# Patient Record
Sex: FEMALE | Race: WHITE | NOT HISPANIC OR LATINO
[De-identification: names, ages, dates, MRNs, and addresses within clinical notes are randomized per-mention and may not be internally consistent; named-entity substitution may affect disease eponyms.]

---

## 2020-03-03 ENCOUNTER — RESULT REVIEW (OUTPATIENT)
Age: 70
End: 2020-03-03

## 2021-09-16 PROBLEM — Z00.00 ENCOUNTER FOR PREVENTIVE HEALTH EXAMINATION: Status: ACTIVE | Noted: 2021-09-16

## 2021-10-12 ENCOUNTER — APPOINTMENT (OUTPATIENT)
Dept: PULMONOLOGY | Facility: CLINIC | Age: 71
End: 2021-10-12
Payer: MEDICARE

## 2021-10-12 VITALS — BODY MASS INDEX: 26.81 KG/M2 | HEIGHT: 59 IN | WEIGHT: 133 LBS

## 2021-10-12 DIAGNOSIS — J44.9 CHRONIC OBSTRUCTIVE PULMONARY DISEASE, UNSPECIFIED: ICD-10-CM

## 2021-10-12 DIAGNOSIS — E78.5 HYPERLIPIDEMIA, UNSPECIFIED: ICD-10-CM

## 2021-10-12 DIAGNOSIS — Z78.9 OTHER SPECIFIED HEALTH STATUS: ICD-10-CM

## 2021-10-12 DIAGNOSIS — G50.0 TRIGEMINAL NEURALGIA: ICD-10-CM

## 2021-10-12 PROCEDURE — 99204 OFFICE O/P NEW MOD 45 MIN: CPT

## 2021-10-12 RX ORDER — DULOXETINE HYDROCHLORIDE 30 MG/1
CAPSULE, DELAYED RELEASE ORAL
Refills: 0 | Status: ACTIVE | COMMUNITY

## 2021-10-12 RX ORDER — CARBAMAZEPINE 200 MG/1
TABLET ORAL
Refills: 0 | Status: ACTIVE | COMMUNITY

## 2021-10-12 RX ORDER — ROSUVASTATIN CALCIUM 20 MG/1
20 TABLET, FILM COATED ORAL
Refills: 0 | Status: ACTIVE | COMMUNITY

## 2021-10-12 NOTE — PHYSICAL EXAM
[General Appearance - Well Developed] : well developed [General Appearance - Well Nourished] : well nourished [IV] : IV [Heart Sounds] : normal S1 and S2 [Murmurs] : no murmurs [] : no respiratory distress [Auscultation Breath Sounds / Voice Sounds] : lungs were clear to auscultation bilaterally [No Focal Deficits] : no focal deficits [Oriented To Time, Place, And Person] : oriented to person, place, and time [Memory Recent] : recent memory was not impaired [FreeTextEntry1] : crowded oropharynx

## 2021-10-12 NOTE — ASSESSMENT
[FreeTextEntry1] : 71-year-old woman with history of severe obstructive sleep apnea for failed CPAP therapy.\par \par We will start with oral appliance therapy and see if the sleep apnea improves, will also consider deviated nasal septal surgery and turbinate hypertrophy reduction once the sleep study is done with oral appliance.

## 2021-10-12 NOTE — HISTORY OF PRESENT ILLNESS
[FreeTextEntry1] : Dr. Cho\par 71 year old woman  with history of DANIELE diagnosed around 2018, was given cpap but couldn’t tolerate with different masks.\par  Patient is here in the sleep center to address sleep apnea.  Patient is sleepy with [place] sleepiness score of 12.  Patient has very loud snoring , also has witnessed apneas.  Patient's bedtime is around 10.30 PM wakes up in the morning around 6.30 AM.  She feels tired when she wakes up.  Patient drinks 5 cups of coffee during the daytime. Patient does not have any headaches, she has nocturia is up about 3 times.  She gets sleepy while driving.\par \par Last sleep study showed AHI of 46.  Patient has also seen ENT physician which showed deviated nasal septum and turbinate hypertrophy.\par \par I do not think fixing the deviated septum or turbinates will improve the sleep apnea significantly.  Given the CPAP failure the next best choice would be to get getting an oral appliance therapy and see if that improves patient's apnea.  I understand patient has trigeminal neuralgia with may pose some problems if he gets worse with oral appliance therapy.  We will discuss further about inspire therapy at that time if oral appliance fails.\par

## 2022-07-11 ENCOUNTER — APPOINTMENT (OUTPATIENT)
Dept: PULMONOLOGY | Facility: CLINIC | Age: 72
End: 2022-07-11

## 2022-07-11 VITALS
HEART RATE: 66 BPM | HEIGHT: 59 IN | BODY MASS INDEX: 26.81 KG/M2 | DIASTOLIC BLOOD PRESSURE: 70 MMHG | SYSTOLIC BLOOD PRESSURE: 130 MMHG | WEIGHT: 133 LBS

## 2022-07-11 PROCEDURE — 99214 OFFICE O/P EST MOD 30 MIN: CPT

## 2022-07-11 NOTE — HISTORY OF PRESENT ILLNESS
[FreeTextEntry1] : Dr. Parikh\par 72 year old woman  with history of DANIELE diagnosed around 2018, was given cpap but couldn’t tolerate with different masks.\par  Patient is here in the sleep center to address sleep apnea.  Patient is sleepy with Martin sleepiness score of 12.  Patient has snoring , also has witnessed apneas.  Patient's bedtime is around 11.30 PM wakes up in the morning around 6 AM.  She feels tired when she wakes up.  Patient drinks 3 cups of coffee during the daytime. Patient does not have any headaches, she has nocturia is up about 3 times.  She is not sleepy while driving.\par \par Last sleep study showed AHI of 46.  \par \par She is looking into inspire therapy due to cpap failure.\par

## 2022-07-11 NOTE — ASSESSMENT
[FreeTextEntry1] : 72-year-old woman with history of severe obstructive sleep apnea for failed CPAP therapy. Patient is interested in alternative options.\par \par Discussed the procedure and next steps for inspire therapy.  We talked about AHI requirements and the BMI requirements for inspire therapy.  We also talked about drug-induced endoscopy and the procedure details.  Explained to her about short-term side effects from the inspire along with tongue dysfunction and swallowing issues.  Explained to her most of the time these issues will resolve with time as the inflammation decreases.  We also talked about long-term complications of the inspire implant.  Patient understood all above and expressed understanding. Refer patient to Dr. Fuller\janina

## 2023-08-24 ENCOUNTER — APPOINTMENT (OUTPATIENT)
Dept: PULMONOLOGY | Facility: CLINIC | Age: 73
End: 2023-08-24
Payer: MEDICARE

## 2023-08-24 VITALS
HEART RATE: 57 BPM | SYSTOLIC BLOOD PRESSURE: 115 MMHG | DIASTOLIC BLOOD PRESSURE: 80 MMHG | HEIGHT: 59 IN | BODY MASS INDEX: 27.82 KG/M2 | WEIGHT: 138 LBS

## 2023-08-24 DIAGNOSIS — G25.81 RESTLESS LEGS SYNDROME: ICD-10-CM

## 2023-08-24 DIAGNOSIS — M41.9 SCOLIOSIS, UNSPECIFIED: ICD-10-CM

## 2023-08-24 PROCEDURE — 99214 OFFICE O/P EST MOD 30 MIN: CPT

## 2023-08-24 NOTE — HISTORY OF PRESENT ILLNESS
[FreeTextEntry1] : Dr. Parikh 73 year old woman  with history of DANIELE diagnosed around 2018, was given cpap but couldn't tolerate with different masks. Her symptoms have gotten worse and would try cpap again with a small mask. Since she has medicare will need repeat sleep study to get a new cpap machine.    Patient is sleepy with Reed sleepiness score of 14.  Patient has snoring , also has witnessed apneas.  Patient's bedtime is around midnight wakes up in the morning around 6.30 AM.  She feels tired when she wakes up.  Patient drinks 3-4 cups of coffee during the daytime. Patient does not have any headaches, she has nocturia is up about 3 times.  She is not sleepy while driving.  Last sleep study showed AHI of 46.  WIll need repeat sleep study to get a new cpap machine.   Patient went for inspire evaluation but decided not to move forward.

## 2023-08-24 NOTE — ASSESSMENT
[FreeTextEntry1] : 73 -year-old woman with history of severe obstructive sleep apnea for failed CPAP therapy. Patient is interested in alternative options.  Discussed the procedure and next steps for inspire therapy.  We talked about AHI requirements and the BMI requirements for inspire therapy.  We also talked about drug-induced endoscopy and the procedure details.  Explained to her about short-term side effects from the inspire along with tongue dysfunction and swallowing issues.  Explained to her most of the time these issues will resolve with time as the inflammation decreases.  We also talked about long-term complications of the inspire implant.  Patient understood all above and expressed understanding. Refer patient to Dr. Fuller

## 2024-06-18 ENCOUNTER — NON-APPOINTMENT (OUTPATIENT)
Age: 74
End: 2024-06-18

## 2024-06-18 DIAGNOSIS — G47.33 OBSTRUCTIVE SLEEP APNEA (ADULT) (PEDIATRIC): ICD-10-CM

## 2024-06-18 NOTE — HISTORY OF PRESENT ILLNESS
[FreeTextEntry1] : Dr. Parikh 73 year old woman  with history of DANIELE diagnosed around 2018, was given cpap but couldn't tolerate with different masks. Her symptoms have gotten worse and would try cpap again with a small mask.  Patient tried and failed oral appliance therapy.    Patient is sleepy with [place] sleepiness score of 14.  Patient has snoring , also has witnessed apneas.  Patient's bedtime is around midnight wakes up in the morning around 6.30 AM.  She feels tired when she wakes up.  Patient drinks 3-4 cups of coffee during the daytime. Patient does not have any headaches, she has nocturia is up about 3 times.  She is not sleepy while driving.  sleep study showed ahi 39.9

## 2024-06-18 NOTE — ASSESSMENT
[FreeTextEntry1] : 73-year-old female with history of severe obstructive sleep apnea, patient has tried and failed oral appliance therapy in the past.  Given Severe obstructive sleep apnea patient had significant symptoms patient is willing to try CPAP again.  Will order AutoPap 5 to 20 cm of pressure

## 2024-08-26 ENCOUNTER — APPOINTMENT (OUTPATIENT)
Dept: PULMONOLOGY | Facility: CLINIC | Age: 74
End: 2024-08-26
Payer: MEDICARE

## 2024-08-26 VITALS
HEIGHT: 59 IN | HEART RATE: 64 BPM | BODY MASS INDEX: 27.82 KG/M2 | DIASTOLIC BLOOD PRESSURE: 64 MMHG | WEIGHT: 138 LBS | SYSTOLIC BLOOD PRESSURE: 104 MMHG

## 2024-08-26 DIAGNOSIS — G47.33 OBSTRUCTIVE SLEEP APNEA (ADULT) (PEDIATRIC): ICD-10-CM

## 2024-08-26 PROCEDURE — 99213 OFFICE O/P EST LOW 20 MIN: CPT

## 2024-08-26 NOTE — HISTORY OF PRESENT ILLNESS
[% Days used: ____] : Days used: [unfilled] % [% Days used > 4 hrs: ____] : Days used > 4 hrs: [unfilled] % [Mean nightly usage: ___ hrs] : Mean nightly usage: [unfilled] hrs [Therapy based AHI: ___ /hr] : Therapy based AHI: [unfilled] / hr [FreeTextEntry1] : Dr. Parikh 74 year old woman  with history of hld, asthma, rls, kyphoscoliosis, tirgeminal neuralgia, DANIELE diagnosed around 2018, is on cpap and doing well.  Patient tried and failed oral appliance therapy.    Patient is sleepy with [place] sleepiness score of 14.  Patient has snoring , also has witnessed apneas.  Patient's bedtime is around midnight wakes up in the morning around 6.30 AM.  She feels tired when she wakes up.  Patient drinks 3-4 cups of coffee during the daytime. Patient does not have any headaches, she has nocturia is up about 3 times.  She is not sleepy while driving.   Patient is less sleepy with [place] sleepiness score of 10.  Patients snoring improved with cpap.  Patient's bedtime is around midnight wakes up in the morning around 6.30 AM.  She feels rested when she wakes up.  Patient drinks 3-4 cups of coffee during the daytime. Patient does not have any headaches, no nocturia with cpap.  She is not sleepy while driving.  sleep study showed ahi 39.9  doing well with cpap on resmed machine ahi 0.7 usage 5 hrs pressure 5-20 cm uses nasal pillows rehan tijerina

## 2024-08-26 NOTE — ASSESSMENT
[FreeTextEntry1] : 74 year  old woman  with sleep apnea is doing well with the CPAP.  Patient is compliant with the CPAP and benefited significantly with the CPAP.

## 2024-09-29 ENCOUNTER — NON-APPOINTMENT (OUTPATIENT)
Age: 74
End: 2024-09-29